# Patient Record
Sex: MALE | Race: WHITE | NOT HISPANIC OR LATINO | Employment: PART TIME | URBAN - METROPOLITAN AREA
[De-identification: names, ages, dates, MRNs, and addresses within clinical notes are randomized per-mention and may not be internally consistent; named-entity substitution may affect disease eponyms.]

---

## 2023-01-23 ENCOUNTER — OFFICE VISIT (OUTPATIENT)
Dept: FAMILY MEDICINE CLINIC | Facility: CLINIC | Age: 18
End: 2023-01-23
Payer: COMMERCIAL

## 2023-01-23 VITALS
SYSTOLIC BLOOD PRESSURE: 100 MMHG | HEIGHT: 71 IN | HEART RATE: 65 BPM | BODY MASS INDEX: 24.27 KG/M2 | WEIGHT: 173.4 LBS | DIASTOLIC BLOOD PRESSURE: 78 MMHG | OXYGEN SATURATION: 98 % | TEMPERATURE: 97.1 F | RESPIRATION RATE: 16 BRPM

## 2023-01-23 DIAGNOSIS — F41.9 ANXIETY: ICD-10-CM

## 2023-01-23 DIAGNOSIS — Z00.00 ENCOUNTER FOR ANNUAL PHYSICAL EXAM: Primary | ICD-10-CM

## 2023-01-23 LAB
BILIRUB BLD-MCNC: NEGATIVE MG/DL
CLARITY, POC: ABNORMAL
COLOR UR: YELLOW
EXPIRATION DATE: ABNORMAL
GLUCOSE UR STRIP-MCNC: NEGATIVE MG/DL
KETONES UR QL: NEGATIVE
LEUKOCYTE EST, POC: NEGATIVE
Lab: ABNORMAL
NITRITE UR-MCNC: NEGATIVE MG/ML
PH UR: 8 [PH] (ref 5–8)
PROT UR STRIP-MCNC: ABNORMAL MG/DL
RBC # UR STRIP: NEGATIVE /UL
SP GR UR: 1.02 (ref 1–1.03)
UROBILINOGEN UR QL: ABNORMAL

## 2023-01-23 PROCEDURE — 99385 PREV VISIT NEW AGE 18-39: CPT | Performed by: FAMILY MEDICINE

## 2023-01-23 PROCEDURE — 81003 URINALYSIS AUTO W/O SCOPE: CPT | Performed by: FAMILY MEDICINE

## 2023-01-23 PROCEDURE — 99214 OFFICE O/P EST MOD 30 MIN: CPT | Performed by: FAMILY MEDICINE

## 2023-01-23 PROCEDURE — 96127 BRIEF EMOTIONAL/BEHAV ASSMT: CPT | Performed by: FAMILY MEDICINE

## 2023-01-23 RX ORDER — SERTRALINE HYDROCHLORIDE 25 MG/1
25 TABLET, FILM COATED ORAL DAILY
Qty: 60 TABLET | Refills: 2 | Status: SHIPPED | OUTPATIENT
Start: 2023-01-23 | End: 2023-01-31 | Stop reason: SDUPTHER

## 2023-01-23 NOTE — PROGRESS NOTES
Patient Care Team:  Thee Weber MD as PCP - General (Urgent Care)     Chief complaint: Patient is in today for a physical          Patient is a 17 y.o. male who presents for his yearly physical exam.     HPI     To establish care, discuss the followings ;    Anxiety and depression for many years , never been on Rx. He feels depressed and anxious all the time, has a lot of anxiety. Denies SI/SA. Seeing therapist biweekly.     Diet: eating RD  Exercise:  exercising regularly.   Smoking ; former smoker     Immunizations: Discussed flu, COVID, Tdap, HPV shot, Up to Date  Dental: Annual check up       Health maintenance/lifestyle:    There is no immunization history on file for this patient.        PHQ-2 Depression Screening  Little interest or pleasure in doing things? 0-->not at all   Feeling down, depressed, or hopeless? 1-->several days   PHQ-2 Total Score 1         Social History     Tobacco Use   Smoking Status Former   • Types: Electronic Cigarette   • Start date:    • Quit date: 2022   • Years since quittin.1   Smokeless Tobacco Never   Tobacco Comments    On and off e cig use     Social History     Substance and Sexual Activity   Alcohol Use Yes   • Alcohol/week: 1.0 standard drink   • Types: 1 Glasses of wine per week         Review of Systems   Psychiatric/Behavioral: Negative for agitation, behavioral problems, decreased concentration and depressed mood. The patient is nervous/anxious.    All other systems reviewed and are negative.        History  Past Medical History:   Diagnosis Date   • Anxiety    • Depression    • Eating disorder    • Headache       History reviewed. No pertinent surgical history.   Allergies   Allergen Reactions   • Penicillins Hives      Family History   Problem Relation Age of Onset   • Anxiety disorder Mother    • Diabetes Mother    • Miscarriages / Stillbirths Mother         Multiple Miscarriages between me and my older brother   • Thyroid disease Mother    •  "Diabetes Father    • Mental illness Maternal Grandfather         Bipolar   • Alcohol abuse Maternal Grandmother    • Mental illness Maternal Grandmother         Bipolar   • Cancer Paternal Grandfather    • Anxiety disorder Brother      Social History     Socioeconomic History   • Marital status: Single   Tobacco Use   • Smoking status: Former     Types: Electronic Cigarette     Start date:      Quit date: 2022     Years since quittin.1   • Smokeless tobacco: Never   • Tobacco comments:     On and off e cig use   Substance and Sexual Activity   • Alcohol use: Yes     Alcohol/week: 1.0 standard drink     Types: 1 Glasses of wine per week   • Drug use: Not Currently     Types: LSD, Marijuana   • Sexual activity: Yes     Partners: Female, Male     Birth control/protection: Condom        Current Outpatient Medications:   •  sertraline (Zoloft) 25 MG tablet, Take 1 tablet by mouth Daily., Disp: 60 tablet, Rfl: 2                  /78   Pulse 65   Temp 97.1 °F (36.2 °C)   Resp 16   Ht 180.3 cm (71\")   Wt 78.7 kg (173 lb 6.4 oz)   SpO2 98%   BMI 24.18 kg/m²       Physical Exam  Vitals and nursing note reviewed.   Constitutional:       General: He is not in acute distress.     Appearance: He is not ill-appearing, toxic-appearing or diaphoretic.      Comments: anxious    HENT:      Head: Normocephalic.      Right Ear: Tympanic membrane, ear canal and external ear normal.      Left Ear: Tympanic membrane, ear canal and external ear normal.      Nose: Nose normal. No congestion or rhinorrhea.      Mouth/Throat:      Mouth: Mucous membranes are moist.      Pharynx: Oropharynx is clear. No oropharyngeal exudate or posterior oropharyngeal erythema.   Eyes:      General: No scleral icterus.        Right eye: No discharge.         Left eye: No discharge.      Extraocular Movements: Extraocular movements intact.      Conjunctiva/sclera: Conjunctivae normal.      Pupils: Pupils are equal, round, and reactive to " light.   Neck:      Comments: No enlarged thyroid      Cardiovascular:      Rate and Rhythm: Normal rate and regular rhythm.      Heart sounds: Normal heart sounds. No murmur heard.    No friction rub. No gallop.   Pulmonary:      Effort: Pulmonary effort is normal. No respiratory distress.      Breath sounds: Normal breath sounds. No stridor. No wheezing, rhonchi or rales.   Abdominal:      General: Bowel sounds are normal. There is no distension.      Palpations: Abdomen is soft. There is no mass.      Tenderness: There is no abdominal tenderness. There is no right CVA tenderness, left CVA tenderness, guarding or rebound.      Hernia: No hernia is present.   Musculoskeletal:         General: Normal range of motion.      Cervical back: Normal range of motion and neck supple.      Right lower leg: No edema.      Left lower leg: No edema.   Lymphadenopathy:      Cervical: No cervical adenopathy.   Skin:     General: Skin is warm.      Coloration: Skin is not jaundiced or pale.      Findings: No erythema or rash.   Neurological:      General: No focal deficit present.      Mental Status: He is alert and oriented to person, place, and time.      Cranial Nerves: No cranial nerve deficit.      Sensory: No sensory deficit.      Motor: No weakness.      Coordination: Coordination normal.      Gait: Gait normal.      Deep Tendon Reflexes: Reflexes normal.   Psychiatric:         Mood and Affect: Mood normal.         Behavior: Behavior normal.         Thought Content: Thought content normal.         Judgment: Judgment normal.                   Diagnoses and all orders for this visit:    1. Encounter for annual physical exam (Primary)  -     POC Urinalysis Dipstick, Automated  -     CBC (No Diff)  -     Comprehensive Metabolic Panel    2. Anxiety;  - New problem, will start on;  -     sertraline (Zoloft) 25 MG tablet; Take 1 tablet by mouth Daily.  Dispense: 60 tablet; Refill: 2  - Discussed possible S/E   -  discussed  compliance with Rx  - Advised patient SSRI medication may take 4-6 weeks to notice an effect.  Discussed potential side effects including headache, dizziness, GI symptoms, sexual side effects, worsening mood or suicidal ideations.  Patient advised to call office for development of any side effects or questions. To ER for SI/HI.     -Age and sex appropriate physical exam performed and documented.   -Updated past medical, family, social and surgical histories as well as allergies and care team list.   -Addressed care gaps listed in the medical record.  -Encouraged annual dental as part of their overall health.  -Encouraged minimum of 30 minutes or more of exercise at a brisk walk or higher 5 days per week combined with a well-balanced diet.         Follow up: Return in about 5 weeks (around 2/27/2023).  Plan of care discussed with pt. They verbalized understanding and agreement.     Thee Weber MD   1/23/2023   16:02 EST

## 2023-01-24 LAB
ALBUMIN SERPL-MCNC: 4.4 G/DL (ref 3.5–5.2)
ALBUMIN/GLOB SERPL: 1.6 G/DL
ALP SERPL-CCNC: 94 U/L (ref 39–117)
ALT SERPL-CCNC: 18 U/L (ref 1–41)
AST SERPL-CCNC: 20 U/L (ref 1–40)
BILIRUB SERPL-MCNC: 0.5 MG/DL (ref 0–1.2)
BUN SERPL-MCNC: 11 MG/DL (ref 6–20)
BUN/CREAT SERPL: 11.1 (ref 7–25)
CALCIUM SERPL-MCNC: 10.1 MG/DL (ref 8.6–10.5)
CHLORIDE SERPL-SCNC: 102 MMOL/L (ref 98–107)
CO2 SERPL-SCNC: 32.3 MMOL/L (ref 22–29)
CREAT SERPL-MCNC: 0.99 MG/DL (ref 0.76–1.27)
EGFRCR SERPLBLD CKD-EPI 2021: 112.5 ML/MIN/1.73
ERYTHROCYTE [DISTWIDTH] IN BLOOD BY AUTOMATED COUNT: 11.9 % (ref 12.3–15.4)
GLOBULIN SER CALC-MCNC: 2.8 GM/DL
GLUCOSE SERPL-MCNC: 64 MG/DL (ref 65–99)
HCT VFR BLD AUTO: 47.8 % (ref 37.5–51)
HGB BLD-MCNC: 16.4 G/DL (ref 13–17.7)
MCH RBC QN AUTO: 31 PG (ref 26.6–33)
MCHC RBC AUTO-ENTMCNC: 34.3 G/DL (ref 31.5–35.7)
MCV RBC AUTO: 90.4 FL (ref 79–97)
PLATELET # BLD AUTO: 211 10*3/MM3 (ref 140–450)
POTASSIUM SERPL-SCNC: 4.5 MMOL/L (ref 3.5–5.2)
PROT SERPL-MCNC: 7.2 G/DL (ref 6–8.5)
RBC # BLD AUTO: 5.29 10*6/MM3 (ref 4.14–5.8)
SODIUM SERPL-SCNC: 139 MMOL/L (ref 136–145)
WBC # BLD AUTO: 6.56 10*3/MM3 (ref 3.4–10.8)

## 2023-01-31 ENCOUNTER — TELEPHONE (OUTPATIENT)
Dept: FAMILY MEDICINE CLINIC | Facility: CLINIC | Age: 18
End: 2023-01-31
Payer: COMMERCIAL

## 2023-01-31 DIAGNOSIS — F41.9 ANXIETY: ICD-10-CM

## 2023-01-31 RX ORDER — SERTRALINE HYDROCHLORIDE 25 MG/1
25 TABLET, FILM COATED ORAL DAILY
Qty: 60 TABLET | Refills: 2 | Status: SHIPPED | OUTPATIENT
Start: 2023-01-31 | End: 2023-02-27 | Stop reason: DRUGHIGH

## 2023-01-31 NOTE — TELEPHONE ENCOUNTER
Caller: Kalia Naik    Relationship: Self    Best call back number: [phone]    What is the best time to reach you: ANY TIME    Who are you requesting to speak with (clinical staff, provider,  specific staff member): CLINICAL STAFF    What was the call regarding: PATIENT STATES THAT HE WOULD LIKE HIS PRESCRIPTION FOR sertraline (Zoloft) 25 MG tablet                       TRANSFERRED TO THE [facility] LISTED BELOW.  ALSO, HE SAYS THAT HE WANTS TO USE THIS PHARMACY FOR ALL FUTURE PRESCRIPTIONS.    [facility] [address] [address]  - [phone]  - [phone]    Do you require a callback: IF NEEDED    PLEASE ADVISE.

## 2023-02-27 ENCOUNTER — OFFICE VISIT (OUTPATIENT)
Dept: FAMILY MEDICINE CLINIC | Facility: CLINIC | Age: 18
End: 2023-02-27
Payer: COMMERCIAL

## 2023-02-27 VITALS
OXYGEN SATURATION: 99 % | DIASTOLIC BLOOD PRESSURE: 68 MMHG | HEART RATE: 87 BPM | SYSTOLIC BLOOD PRESSURE: 106 MMHG | HEIGHT: 71 IN | BODY MASS INDEX: 23.94 KG/M2 | WEIGHT: 171 LBS | TEMPERATURE: 96.9 F

## 2023-02-27 DIAGNOSIS — F33.41 MAJOR DEPRESSIVE DISORDER, RECURRENT EPISODE, IN PARTIAL REMISSION: ICD-10-CM

## 2023-02-27 DIAGNOSIS — F41.9 ANXIETY: Primary | ICD-10-CM

## 2023-02-27 PROCEDURE — 99214 OFFICE O/P EST MOD 30 MIN: CPT | Performed by: FAMILY MEDICINE

## 2023-02-27 NOTE — PROGRESS NOTES
Subjective     Kalia Naik is a 17 y.o. male.     Chief Complaint   Patient presents with   • Anxiety     5W fu PHQ 11 JUAN J 14    • Depression       History of Present Illness     Has chronic Anxiety and depression since adolescent, he is in lot of stress at work,has anxiety most of the time, feels depressed, he is the manager at [employer].  Last OV 4 weeks ago, started on Zoloft 25 mg , he missed 2 doses , he still did not notice any difference , no S/E reported.  Denies SI/SA.   Seeing therapist biweekly.       The following portions of the patient's history were reviewed and updated as appropriate: allergies, current medications, past family history, past medical history, past social history, past surgical history and problem list.        Review of Systems   Psychiatric/Behavioral: Positive for depressed mood. The patient is nervous/anxious.        Vitals:    02/27/23 1048   BP: 106/68   Pulse: 87   Temp: 96.9 °F (36.1 °C)   SpO2: 99%           02/27/23  1048   Weight: 77.6 kg (171 lb)         Body mass index is 23.85 kg/m².      Current Outpatient Medications   Medication Sig Dispense Refill   • sertraline (Zoloft) 50 MG tablet Take 1 tablet by mouth Daily. 60 tablet 2     No current facility-administered medications for this visit.                Objective   Physical Exam  Vitals and nursing note reviewed.   Constitutional:       General: He is not in acute distress.     Appearance: He is not ill-appearing, toxic-appearing or diaphoretic.   Neck:      Comments: No enlarged thyroid    Cardiovascular:      Rate and Rhythm: Normal rate and regular rhythm.      Heart sounds: Normal heart sounds. No murmur heard.  Pulmonary:      Effort: Pulmonary effort is normal. No respiratory distress.      Breath sounds: Normal breath sounds. No stridor. No wheezing or rhonchi.   Musculoskeletal:      Cervical back: Neck supple.   Neurological:      Mental Status: He is alert and oriented to person, place, and time.    Psychiatric:         Mood and Affect: Mood normal.         Behavior: Behavior normal.         Thought Content: Thought content normal.         Judgment: Judgment normal.           Assessment & Plan   Diagnoses and all orders for this visit:    1. Anxiety (Primary);  - Not at goal, will increase dose to 50 mg/day  -     sertraline (Zoloft) 50 MG tablet; Take 1 tablet by mouth Daily.  Dispense: 60 tablet; Refill: 2  - Close monitoring and f/u     2. Major depressive disorder, recurrent episode, in partial remission (HCC)  - Not at goal, will increase dose to 50 mg/day  -     sertraline (Zoloft) 50 MG tablet; Take 1 tablet by mouth Daily.  Dispense: 60 tablet; Refill: 2  - Close monitoring and f/u       Patient was given instructions and counseling regarding her condition or for health maintenance advice.   Please see specific information pulled into the AVS if appropriate.   Medical assistant and I wore mask and eyewear protection during entire encounter.    Patient wore mask.         I have fully discussed the nature of the medical condition(s) risks, complications, management, safe and proper use of medications.   She stated no allergy to the above prescribed medication.  I have discussed the SIDE EFFECT OF MEDICATION and importance TO report any side effect , the patient expressed good understanding.  Encouraged medication compliance and the importance of keeping scheduled follow up appointments with me and any other providers.    Patient instructed to follow up with our office for results on any labs/imaging ordered during this visit.    Home care discussed  All questions answered  Patient verbalizes understanding and agrees to treatment plan.     Follow up: Return in about 2 months (around 4/27/2023) for follow up on current illness.

## 2023-09-08 ENCOUNTER — OFFICE VISIT (OUTPATIENT)
Dept: FAMILY MEDICINE CLINIC | Facility: CLINIC | Age: 18
End: 2023-09-08
Payer: COMMERCIAL

## 2023-09-08 VITALS
BODY MASS INDEX: 25.37 KG/M2 | HEIGHT: 71 IN | TEMPERATURE: 98 F | WEIGHT: 181.2 LBS | HEART RATE: 82 BPM | SYSTOLIC BLOOD PRESSURE: 102 MMHG | DIASTOLIC BLOOD PRESSURE: 60 MMHG | OXYGEN SATURATION: 98 %

## 2023-09-08 DIAGNOSIS — R06.02 SOB (SHORTNESS OF BREATH): Primary | ICD-10-CM

## 2023-09-08 PROCEDURE — 99213 OFFICE O/P EST LOW 20 MIN: CPT | Performed by: FAMILY MEDICINE

## 2023-09-08 RX ORDER — BUPROPION HYDROCHLORIDE 300 MG/1
1 TABLET ORAL DAILY
COMMUNITY
Start: 2023-08-27

## 2023-09-08 RX ORDER — HYDROXYZINE HYDROCHLORIDE 25 MG/1
25 TABLET, FILM COATED ORAL EVERY 6 HOURS PRN
COMMUNITY
Start: 2023-09-06

## 2023-09-08 NOTE — PROGRESS NOTES
Subjective     Kalia Naik is a 18 y.o. male.     Chief Complaint   Patient presents with    Shortness of Breath     Found out he has mold in house wants to get checked out       History of Present Illness     He is c/o SOB for last few weeks , worse with sitting laying down or sleeping   No SOB with exercise   No cough , F,C  Has Seasonal allergies   He noticed a mold in the house        The following portions of the patient's history were reviewed and updated as appropriate: allergies, current medications, past family history, past medical history, past social history, past surgical history, and problem list.        Review of Systems   Respiratory:  Positive for shortness of breath.    All other systems reviewed and are negative.    Vitals:    09/08/23 1535   BP: 102/60   Pulse: 82   Temp: 98 °F (36.7 °C)   SpO2: 98%           09/08/23  1535   Weight: 82.2 kg (181 lb 3.2 oz)         Body mass index is 25.27 kg/m².      Current Outpatient Medications   Medication Sig Dispense Refill    buPROPion XL (WELLBUTRIN XL) 300 MG 24 hr tablet Take 1 tablet by mouth Daily.      hydrOXYzine (ATARAX) 25 MG tablet Take 1 tablet by mouth Every 6 (Six) Hours As Needed.      sertraline (Zoloft) 50 MG tablet Take 1 tablet by mouth Daily. 60 tablet 2     No current facility-administered medications for this visit.                Objective   Physical Exam  Vitals and nursing note reviewed.   Constitutional:       General: He is not in acute distress.     Appearance: He is not ill-appearing, toxic-appearing or diaphoretic.   Cardiovascular:      Rate and Rhythm: Normal rate and regular rhythm.      Heart sounds: Normal heart sounds. No murmur heard.    No friction rub. No gallop.   Pulmonary:      Effort: Pulmonary effort is normal. No respiratory distress.      Breath sounds: Normal breath sounds. No stridor. No wheezing, rhonchi or rales.   Neurological:      Mental Status: He is alert and oriented to person, place, and time.    Psychiatric:         Mood and Affect: Mood normal.         Behavior: Behavior normal.         Thought Content: Thought content normal.         Assessment & Plan   Diagnoses and all orders for this visit:    1. SOB (shortness of breath) (Primary)  Comments:  consider referal to Pulm if no better  Orders:  -     XR Chest PA & Lateral (In Office)      CXR did not show acute changes per my reading , pending radiology result      Patient was given instructions and counseling regarding her condition or for health maintenance advice.   Please see specific information pulled into the AVS if appropriate.       I have fully discussed the nature of the medical condition(s) risks, complications, management, safe and proper use of medications.   Encouraged medication compliance and the importance of keeping scheduled follow up appointments with me and any other providers.    Patient instructed to follow up with our office for results on any labs/imaging ordered during this visit.    Home care discussed  All questions answered  Patient verbalizes understanding and agrees to treatment plan.     Follow up: Return for if no better or worsening symptoms.

## 2023-11-28 ENCOUNTER — TELEPHONE (OUTPATIENT)
Dept: FAMILY MEDICINE CLINIC | Facility: CLINIC | Age: 18
End: 2023-11-28

## 2023-12-04 NOTE — TELEPHONE ENCOUNTER
Need to get more information   There are some cancer that need genetic testing and at certain age, no ALL cancer needs genetic testing

## 2023-12-28 ENCOUNTER — OFFICE VISIT (OUTPATIENT)
Dept: FAMILY MEDICINE CLINIC | Facility: CLINIC | Age: 18
End: 2023-12-28
Payer: COMMERCIAL

## 2023-12-28 VITALS
BODY MASS INDEX: 26.43 KG/M2 | OXYGEN SATURATION: 98 % | HEIGHT: 71 IN | SYSTOLIC BLOOD PRESSURE: 114 MMHG | DIASTOLIC BLOOD PRESSURE: 74 MMHG | HEART RATE: 83 BPM | RESPIRATION RATE: 16 BRPM | TEMPERATURE: 97.1 F | WEIGHT: 188.8 LBS

## 2023-12-28 DIAGNOSIS — R06.02 SOB (SHORTNESS OF BREATH): Primary | ICD-10-CM

## 2023-12-28 DIAGNOSIS — R07.89 ATYPICAL CHEST PAIN: ICD-10-CM

## 2023-12-28 DIAGNOSIS — R42 EPISODE OF DIZZINESS: ICD-10-CM

## 2023-12-28 PROCEDURE — 99214 OFFICE O/P EST MOD 30 MIN: CPT | Performed by: FAMILY MEDICINE

## 2023-12-28 PROCEDURE — 93000 ELECTROCARDIOGRAM COMPLETE: CPT | Performed by: FAMILY MEDICINE

## 2023-12-28 NOTE — PROGRESS NOTES
"Subjective     Kalia Naik is a 18 y.o. adult.     Chief Complaint   Patient presents with    Chest Pain     Discomfort x 1-2 months     Shortness of Breath     X 5-6 months        History of Present Illness     C/o SOB for > 3 months , on/off at rest, sitting and laying down with on/off CP , no palpitation  CXR was neg   He moved to new apart with no mold , still has sx  He is on tapering Zoloft , feels dizzy on/off from d/c medication   No FHX  for heart dis   No F,C       ECG 12 Lead    Date/Time: 12/28/2023 4:22 PM  Performed by: Thee Weber MD    Authorized by: Thee Weber MD  Previous ECG: no previous ECG available  Rhythm: sinus rhythm  Rate: normal  Conduction: conduction normal  ST Segments: ST segments normal  T Waves: T waves normal  QRS axis: normal  Other: no other findings    Clinical impression: normal ECG  Comments: Discussed             The following portions of the patient's history were reviewed and updated as appropriate: allergies, current medications, past family history, past medical history, past social history, past surgical history, and problem list.        Review of Systems   Respiratory:  Positive for shortness of breath.    Cardiovascular:  Positive for chest pain.       Vitals:    12/28/23 1601   BP: 114/74   Pulse: 83   Resp: 16   Temp: 97.1 °F (36.2 °C)   SpO2: 98%           12/28/23  1601   Weight: 85.6 kg (188 lb 12.8 oz)         Body mass index is 26.34 kg/m².      Current Outpatient Medications   Medication Sig Dispense Refill    buPROPion XL (WELLBUTRIN XL) 300 MG 24 hr tablet Take 1 tablet by mouth Daily.      hydrOXYzine (ATARAX) 25 MG tablet Take 1 tablet by mouth Every 6 (Six) Hours As Needed.      sertraline (Zoloft) 50 MG tablet Take 1 tablet by mouth Daily. 60 tablet 2     No current facility-administered medications for this visit.                Objective   Physical Exam  Vitals and nursing note reviewed.   Constitutional:       General: Kalia Naik \"FERN\" " "is not in acute distress.     Appearance: Kalia Naik \"EFRAÍN\" is not toxic-appearing.   Cardiovascular:      Rate and Rhythm: Normal rate and regular rhythm.      Heart sounds: Normal heart sounds. No murmur heard.  Pulmonary:      Effort: Pulmonary effort is normal. No respiratory distress.      Breath sounds: Normal breath sounds. No stridor. No wheezing or rhonchi.   Neurological:      Mental Status: Kalia Arredondo"NELN\" is alert and oriented to person, place, and time.   Psychiatric:         Mood and Affect: Mood normal.         Behavior: Behavior normal.         Thought Content: Thought content normal.           Assessment & Plan   Diagnoses and all orders for this visit:    1. SOB (shortness of breath) (Primary)  Comments:  CXR done before, was neg  normal EKG  referral to cardio for further testing  Orders:  -     ECG 12 Lead  -     Ambulatory Referral to Cardiology    2. Atypical chest pain  Comments:  as above  Orders:  -     ECG 12 Lead  -     Ambulatory Referral to Cardiology    3. Episode of dizziness  Comments:  as above  Orders:  -     Ambulatory Referral to Cardiology          Patient was given instructions and counseling regarding her condition or for health maintenance advice.   Please see specific information pulled into the AVS if appropriate.       I have fully discussed the nature of the medical condition(s) risks, complications, management, safe and proper use of medications.   Encouraged medication compliance and the importance of keeping scheduled follow up appointments with me and any other providers.    Patient instructed to follow up with our office for results on any labs/imaging ordered during this visit.    Home care discussed  All questions answered  Patient verbalizes understanding and agrees to treatment plan.     Follow up: Return for if no better or worsening symptoms.           "

## 2023-12-29 ENCOUNTER — TELEPHONE (OUTPATIENT)
Dept: FAMILY MEDICINE CLINIC | Facility: CLINIC | Age: 18
End: 2023-12-29
Payer: COMMERCIAL

## 2024-04-02 ENCOUNTER — OFFICE VISIT (OUTPATIENT)
Dept: FAMILY MEDICINE CLINIC | Facility: CLINIC | Age: 19
End: 2024-04-02
Payer: COMMERCIAL

## 2024-04-02 VITALS
TEMPERATURE: 97.3 F | HEIGHT: 71 IN | WEIGHT: 207.2 LBS | DIASTOLIC BLOOD PRESSURE: 70 MMHG | RESPIRATION RATE: 16 BRPM | SYSTOLIC BLOOD PRESSURE: 100 MMHG | HEART RATE: 70 BPM | BODY MASS INDEX: 29.01 KG/M2 | OXYGEN SATURATION: 97 %

## 2024-04-02 DIAGNOSIS — F41.9 ANXIETY: ICD-10-CM

## 2024-04-02 DIAGNOSIS — Z00.00 ENCOUNTER FOR ANNUAL PHYSICAL EXAM: Primary | ICD-10-CM

## 2024-04-02 DIAGNOSIS — Z11.59 NEED FOR HEPATITIS C SCREENING TEST: ICD-10-CM

## 2024-04-02 DIAGNOSIS — Z23 IMMUNIZATION DUE: ICD-10-CM

## 2024-04-02 RX ORDER — BUSPIRONE HYDROCHLORIDE 5 MG/1
1 TABLET ORAL DAILY
COMMUNITY
Start: 2023-12-21

## 2024-04-02 NOTE — PROGRESS NOTES
Patient Care Team:  Thee Weber MD as PCP - General (Urgent Care)     Chief complaint: Patient is in today for a physical          Patient is a 19 y.o. adult who presents for FERN's yearly physical exam.     HPI     ANXIETY;  well controlled with current Rx, no S/E reported. Following with psych monthly.  Eating RD .   Exercising routinely 2 x /week   Immunizations: reviewed and discussed.       Health maintenance/lifestyle:  Immunization History   Administered Date(s) Administered    COVID-19 (DANIEL) 2021    COVID-19 F23 (MODERNA) 12YRS+ (SPIKEVAX) 2023    DTaP 2003, 2003, 2003, 2004, 2007    Hepatitis A 01/15/2018, 2018    Hepatitis B Adult/Adolescent IM 2003, 2003, 2003, 01/15/2018    IPV 2003, 2003, 2004, 2007    Influenza Injectable Mdck Pf Quad 2023    MMR 2004, 2007    Meningococcal ACYW (MENQUADFI) 2014, 10/14/2019    Meningococcal B,(Bexsero) 10/14/2019    Tdap 2014    Varicella 2004           Social History     Tobacco Use   Smoking Status Former    Types: Electronic Cigarette    Start date:     Quit date: 2022    Years since quittin.3   Smokeless Tobacco Never   Tobacco Comments    On and off e cig use     Social History     Substance and Sexual Activity   Alcohol Use Yes    Alcohol/week: 1.0 standard drink of alcohol    Types: 1 Glasses of wine per week         Review of Systems   Gastrointestinal:  Negative for abdominal pain.         History  Past Medical History:   Diagnosis Date    ADHD (attention deficit hyperactivity disorder)     Rule out diagnosis    Anxiety     Depression     Eating disorder     Headache       History reviewed. No pertinent surgical history.   Allergies   Allergen Reactions    Penicillins Hives      Family History   Problem Relation Age of Onset    Anxiety disorder Mother     Diabetes Mother     Miscarriages / Stillbirths Mother         " Multiple Miscarriages between me and my older brother    Thyroid disease Mother     Diabetes Father     Mental illness Maternal Grandfather         Bipolar    Alcohol abuse Maternal Grandmother     Mental illness Maternal Grandmother         Bipolar    Cancer Paternal Grandfather     Anxiety disorder Brother     Depression Brother     Stroke Maternal Aunt         Small stroke     Social History     Socioeconomic History    Marital status: Single   Tobacco Use    Smoking status: Former     Types: Electronic Cigarette     Start date:      Quit date: 2022     Years since quittin.3    Smokeless tobacco: Never    Tobacco comments:     On and off e cig use   Substance and Sexual Activity    Alcohol use: Yes     Alcohol/week: 1.0 standard drink of alcohol     Types: 1 Glasses of wine per week    Drug use: Not Currently     Types: LSD, Marijuana, MDMA (ecstacy), Psilocybin    Sexual activity: Yes     Partners: Female, Male     Birth control/protection: Condom        Current Outpatient Medications:     busPIRone (BUSPAR) 5 MG tablet, Take 1 tablet by mouth Daily., Disp: , Rfl:     sertraline (Zoloft) 50 MG tablet, Take 1 tablet by mouth Daily., Disp: 60 tablet, Rfl: 2                  /70   Pulse 70   Temp 97.3 °F (36.3 °C)   Resp 16   Ht 180.3 cm (70.98\")   Wt 94 kg (207 lb 3.2 oz)   SpO2 97%   BMI 28.91 kg/m²       Physical Exam  Vitals and nursing note reviewed.   Constitutional:       General: EFRAÍN is not in acute distress.     Appearance: EFRAÍN is not ill-appearing, toxic-appearing or diaphoretic.   HENT:      Head: Normocephalic.      Right Ear: Tympanic membrane, ear canal and external ear normal.      Left Ear: Tympanic membrane, ear canal and external ear normal.      Nose: Nose normal. No congestion or rhinorrhea.      Mouth/Throat:      Mouth: Mucous membranes are moist.      Pharynx: Oropharynx is clear. No oropharyngeal exudate or posterior oropharyngeal erythema.   Eyes:      General: No " scleral icterus.        Right eye: No discharge.         Left eye: No discharge.      Extraocular Movements: Extraocular movements intact.      Conjunctiva/sclera: Conjunctivae normal.      Pupils: Pupils are equal, round, and reactive to light.   Neck:      Comments: No enlarged thyroid      Cardiovascular:      Rate and Rhythm: Normal rate and regular rhythm.      Heart sounds: Normal heart sounds. No murmur heard.     No friction rub. No gallop.   Pulmonary:      Effort: Pulmonary effort is normal. No respiratory distress.      Breath sounds: Normal breath sounds. No stridor. No wheezing, rhonchi or rales.   Abdominal:      General: Bowel sounds are normal. There is no distension.      Palpations: Abdomen is soft. There is no mass.      Tenderness: There is no abdominal tenderness. There is no right CVA tenderness, left CVA tenderness, guarding or rebound.      Hernia: No hernia is present.   Musculoskeletal:         General: Normal range of motion.      Cervical back: Normal range of motion and neck supple.      Right lower leg: No edema.      Left lower leg: No edema.   Lymphadenopathy:      Cervical: No cervical adenopathy.   Skin:     General: Skin is warm.      Coloration: Skin is not jaundiced or pale.      Findings: No erythema or rash.   Neurological:      General: No focal deficit present.      Mental Status: FERN is alert and oriented to person, place, and time.      Deep Tendon Reflexes: Reflexes normal.   Psychiatric:         Mood and Affect: Mood normal.         Behavior: Behavior normal.         Thought Content: Thought content normal.         Judgment: Judgment normal.                   Diagnoses and all orders for this visit:    1. Encounter for annual physical exam (Primary)  -     Basic Metabolic Panel  -     Lipid Panel  -     CBC (No Diff)    2. Anxiety    3. Immunization due  -     HPV Vaccine (HPV9)    4. Need for hepatitis C screening test  -     Hepatitis C Antibody      -Age and sex  appropriate physical exam performed and documented.   -Updated past medical, family, social and surgical histories as well as allergies and care team list.   -Addressed care gaps listed in the medical record.  -advised to eat healthy diet, do daily exercise   - discussed and updates preventive screening measures         Follow up: Return in about 1 year (around 4/2/2025) for physical.  Plan of care discussed with pt. They verbalized understanding and agreement.     Thee Weber MD   4/2/2024   15:34 EDT

## 2024-04-03 LAB
BUN SERPL-MCNC: 12 MG/DL (ref 6–20)
BUN/CREAT SERPL: 13.5 (ref 7–25)
CALCIUM SERPL-MCNC: 9.3 MG/DL (ref 8.6–10.5)
CHLORIDE SERPL-SCNC: 106 MMOL/L (ref 98–107)
CHOLEST SERPL-MCNC: 178 MG/DL (ref 0–200)
CO2 SERPL-SCNC: 24.3 MMOL/L (ref 22–29)
CREAT SERPL-MCNC: 0.89 MG/DL (ref 0.76–1.27)
EGFRCR SERPLBLD CKD-EPI 2021: 125 ML/MIN/1.73
ERYTHROCYTE [DISTWIDTH] IN BLOOD BY AUTOMATED COUNT: 12.2 % (ref 12.3–15.4)
GLUCOSE SERPL-MCNC: 87 MG/DL (ref 65–99)
HCT VFR BLD AUTO: 47.7 % (ref 37.5–51)
HCV IGG SERPL QL IA: NON REACTIVE
HDLC SERPL-MCNC: 59 MG/DL (ref 40–60)
HGB BLD-MCNC: 16 G/DL (ref 13–17.7)
LDLC SERPL CALC-MCNC: 110 MG/DL (ref 0–100)
MCH RBC QN AUTO: 30.4 PG (ref 26.6–33)
MCHC RBC AUTO-ENTMCNC: 33.5 G/DL (ref 31.5–35.7)
MCV RBC AUTO: 90.7 FL (ref 79–97)
PLATELET # BLD AUTO: 202 10*3/MM3 (ref 140–450)
POTASSIUM SERPL-SCNC: 4.4 MMOL/L (ref 3.5–5.2)
RBC # BLD AUTO: 5.26 10*6/MM3 (ref 4.14–5.8)
SODIUM SERPL-SCNC: 139 MMOL/L (ref 136–145)
TRIGL SERPL-MCNC: 47 MG/DL (ref 0–150)
VLDLC SERPL CALC-MCNC: 9 MG/DL (ref 5–40)
WBC # BLD AUTO: 4.96 10*3/MM3 (ref 3.4–10.8)

## 2024-05-02 ENCOUNTER — CLINICAL SUPPORT (OUTPATIENT)
Dept: FAMILY MEDICINE CLINIC | Facility: CLINIC | Age: 19
End: 2024-05-02
Payer: COMMERCIAL

## 2024-05-02 DIAGNOSIS — Z23 IMMUNIZATION DUE: Primary | ICD-10-CM

## 2024-05-02 PROCEDURE — 90471 IMMUNIZATION ADMIN: CPT | Performed by: FAMILY MEDICINE

## 2024-05-02 PROCEDURE — 90651 9VHPV VACCINE 2/3 DOSE IM: CPT | Performed by: FAMILY MEDICINE

## 2024-12-25 NOTE — TELEPHONE ENCOUNTER
"    Caller: Kalia Naik \"EFRAÍN\"    Relationship: Self    Best call back number:     [phone] (Mobile)       What is the medical concern/diagnosis: DIAGNOSTIC TESTING FOR CANCER ISSUES /OTHER    What specialty or service is being requested: PATIENT WOULD LIKE REFERRAL FOR GENETIC TESTING IF NOT DONE AT PRIMARY CARE OFFICE.    PLEASE CALL TO DISCUSS/ADVISE  " DISPLAY PLAN FREE TEXT DISPLAY PLAN FREE TEXT DISPLAY PLAN FREE TEXT DISPLAY PLAN FREE TEXT DISPLAY PLAN FREE TEXT DISPLAY PLAN FREE TEXT DISPLAY PLAN FREE TEXT DISPLAY PLAN FREE TEXT

## 2025-03-28 ENCOUNTER — TELEPHONE (OUTPATIENT)
Dept: FAMILY MEDICINE CLINIC | Facility: CLINIC | Age: 20
End: 2025-03-28
Payer: COMMERCIAL

## 2025-03-28 NOTE — TELEPHONE ENCOUNTER
"  Caller: Kalia Naik \"EFRAÍN\"    Relationship: Self    Best call back number: [phone]     What is the best time to reach you: ANYTIME BEFORE 4PM    Who are you requesting to speak with (clinical staff, provider,  specific staff member): CLINICAL STAFF    Do you know the name of the person who called:      What was the call regarding: PATIENT IS REQUESTING TO HAVE SHORT TERM DISABILITY PAPERWORK FILLED OUT AND SENT BACK.     Is it okay if the provider responds through MyChart: YES     "

## 2025-03-31 NOTE — TELEPHONE ENCOUNTER
Pt stated he has Mononucleosis last Tuesday pt went to [facility].Pt wants to know how long he needs to be off of work? And how long will he be contiguous please advise pt has [place] paperwork also for being out with [place].

## 2025-04-01 NOTE — TELEPHONE ENCOUNTER
Pt stated he would like to know when can he return back to work, he may not need FMLA paper filled out went to [facility] 3/27/25 please advise.

## 2025-04-01 NOTE — TELEPHONE ENCOUNTER
Mono spread via close contact with the saliva of an infected person, such as through kissing, sharing drinks, or using the same utensils.   So he can go back to work  But no sport for 6 weeks